# Patient Record
Sex: FEMALE | Race: WHITE | NOT HISPANIC OR LATINO
[De-identification: names, ages, dates, MRNs, and addresses within clinical notes are randomized per-mention and may not be internally consistent; named-entity substitution may affect disease eponyms.]

---

## 2018-01-29 ENCOUNTER — APPOINTMENT (OUTPATIENT)
Dept: OBGYN | Facility: CLINIC | Age: 63
End: 2018-01-29
Payer: COMMERCIAL

## 2018-01-29 VITALS
HEART RATE: 74 BPM | WEIGHT: 139 LBS | SYSTOLIC BLOOD PRESSURE: 148 MMHG | BODY MASS INDEX: 23.73 KG/M2 | HEIGHT: 64 IN | DIASTOLIC BLOOD PRESSURE: 91 MMHG

## 2018-01-29 PROCEDURE — 99213 OFFICE O/P EST LOW 20 MIN: CPT | Mod: 25

## 2018-01-29 PROCEDURE — 99396 PREV VISIT EST AGE 40-64: CPT

## 2018-02-13 LAB
CYTOLOGY CVX/VAG DOC THIN PREP: NORMAL
HPV HIGH+LOW RISK DNA PNL CVX: DETECTED

## 2019-02-06 ENCOUNTER — APPOINTMENT (OUTPATIENT)
Dept: OBGYN | Facility: CLINIC | Age: 64
End: 2019-02-06
Payer: COMMERCIAL

## 2019-02-06 VITALS
BODY MASS INDEX: 24.59 KG/M2 | WEIGHT: 144.06 LBS | SYSTOLIC BLOOD PRESSURE: 133 MMHG | HEIGHT: 64 IN | DIASTOLIC BLOOD PRESSURE: 76 MMHG | HEART RATE: 90 BPM

## 2019-02-06 PROCEDURE — 57456 ENDOCERV CURETTAGE W/SCOPE: CPT

## 2019-02-06 NOTE — PROCEDURE
[Colposcopy] : colposcopy [HPV high risk] : PCR positive for high risk HPV [Patient] : patient [Risks] : risks [Benefits] : benefits [Alternatives] : alternatives [Infection] : infection [Bleeding] : bleeding [Allergic Reaction] : allergic reaction [Consent Obtained] : written consent was obtained prior to the procedure [Biopsies Taken: # ___] : no biopsies were taken of the cervix [ECC Done] : Endocervical curettage was performed.  [Tolerated Well] : the patient tolerated the procedure well [No Complications] : there were no complications

## 2019-02-15 LAB — CORE LAB BIOPSY: NORMAL

## 2019-10-04 ENCOUNTER — APPOINTMENT (OUTPATIENT)
Dept: MAMMOGRAPHY | Facility: CLINIC | Age: 64
End: 2019-10-04
Payer: COMMERCIAL

## 2019-10-04 PROCEDURE — 77063 BREAST TOMOSYNTHESIS BI: CPT

## 2019-10-04 PROCEDURE — 77067 SCR MAMMO BI INCL CAD: CPT

## 2020-08-31 ENCOUNTER — APPOINTMENT (OUTPATIENT)
Dept: ORTHOPEDIC SURGERY | Facility: CLINIC | Age: 65
End: 2020-08-31
Payer: COMMERCIAL

## 2020-08-31 VITALS
TEMPERATURE: 97.8 F | BODY MASS INDEX: 24.59 KG/M2 | WEIGHT: 144 LBS | HEART RATE: 88 BPM | HEIGHT: 64 IN | DIASTOLIC BLOOD PRESSURE: 95 MMHG | SYSTOLIC BLOOD PRESSURE: 145 MMHG

## 2020-08-31 PROCEDURE — 99203 OFFICE O/P NEW LOW 30 MIN: CPT | Mod: 25

## 2020-08-31 PROCEDURE — 20550 NJX 1 TENDON SHEATH/LIGAMENT: CPT | Mod: F5

## 2020-08-31 NOTE — DISCUSSION/SUMMARY
[FreeTextEntry1] : She has findings consistent with a right trigger thumb.\par \par I had a discussion regarding today's visit, the diagnosis, and treatment recommendations / options. At this time, she agreed to proceed with a cortisone injection. \par \par The patient has agreed to this plan of management and has expressed full understanding.  All questions were fully answered to the patient's satisfaction.\par \par Over 50% of the time spent with the patient was on counseling the patient on the above diagnosis, treatment plan and prognosis.\par \par

## 2020-08-31 NOTE — HISTORY OF PRESENT ILLNESS
[Right] : right hand dominant [FreeTextEntry1] : She comes in today for an evaluation of a right thumb pain after gardening in the spring 4-5 weeks ago. She has complaints of locking, clicking, and a decreased range of motion.  She notes she experiences most of her pain particularly in the morning. She states she was diagnosed with cellulitis 3 weeks ago. She states she has been taking Doxy in which she takes her last pill today. She also notes she has been taking Prednisone as well. \par \par She works as a .

## 2020-08-31 NOTE — PROCEDURE
[FreeTextEntry1] : -  After a discussion of risks and benefits, the patient agreed to proceed with a cortisone injection.  \par -  Side: Right \par -  Finger: thumb\par -  Medications: 0.5 cc of 1% Lidocaine and 1 cc of Betamethasone, 6mg/cc, using sterile technique.\par -  Patient tolerated the procedure well, without complications.\par -  Patient was told that the symptoms may worsen for a day or two, and should then begin to improve. \par -  Instructions: Patient was instructed on activity modification for the next several days.\par -  Follow-up: Within 4 weeks to assess response to the injection.\par \par

## 2020-08-31 NOTE — END OF VISIT
[FreeTextEntry3] : All medical record entries made by the Scribe were at my, Dr. Casanova, direction and personally dictated by me on 08/31/2020. I have reviewed the chart and agree that the record accurately reflects my personal performances of the history, physical exam, assessment, and plan. I have also personally directed, reviewed, and agreed with the chart.\par \par

## 2020-08-31 NOTE — ADDENDUM
[FreeTextEntry1] : I, Karla Gurrola, acted solely as a scribe for Dr. Casanova on this date 08/31/2020.\par \par

## 2020-08-31 NOTE — PHYSICAL EXAM
[de-identified] : - Constitutional: This is a female in no obvious distress.  \par - Psych: Patient is alert and oriented to person, place and time.  Patient has a normal mood and affect.\par - Cardiovascular: Normal pulses throughout the upper extremities.  No significant varicosities are noted in the upper extremities. \par - Neuro: Strength and sensation are intact throughout the upper extremities.  Patient has normal coordination.\par - Respiratory:  Patient exhibits no evidence of shortness of breath or difficulty breathing.\par - Skin: No rashes, lesions, or other abnormalities are noted in the upper extremities.\par ---\par \par -  Side: Right Thumb\par -  There is swelling and tenderness along the A-1 pulley of the thumb.  \par -  There is evidence of active triggering with flexion and extension of the thumb.\par -  There is no evidence of an IP joint flexion contracture.  \par -  There is no tenderness along the MCP or CMC joints of the thumb.  \par -  There is no tenderness along the first dorsal compartment.  \par -  There is no evidence of triggering of the adjacent digits.\par -  There is full range-of-motion of the other digits into the palm.\par -  Provocative signs for carpal tunnel syndrome are negative.\par -  There is normal strength and sensation distally along the radial, ulnar and median nerve distributions.

## 2020-10-08 ENCOUNTER — APPOINTMENT (OUTPATIENT)
Dept: ORTHOPEDIC SURGERY | Facility: CLINIC | Age: 65
End: 2020-10-08
Payer: COMMERCIAL

## 2020-10-08 VITALS — WEIGHT: 144 LBS | HEIGHT: 64 IN | TEMPERATURE: 97.8 F | BODY MASS INDEX: 24.59 KG/M2

## 2020-10-08 PROCEDURE — 99213 OFFICE O/P EST LOW 20 MIN: CPT

## 2020-10-08 NOTE — END OF VISIT
[FreeTextEntry3] : All medical record entries made by the Scribe were at my, Dr. Casanova, direction and personally dictated by me on 10/08/2020. I have reviewed the chart and agree that the record accurately reflects my personal performances of the history, physical exam, assessment, and plan. I have also personally directed, reviewed, and agreed with the chart.\par \par

## 2020-10-08 NOTE — DISCUSSION/SUMMARY
[FreeTextEntry1] : I had a discussion regarding today's visit, the diagnosis and treatment recommendations / options.  At this time, as her symptoms have essentially resolved, I have recommended observation. If her symptoms reoccur, she will return to the office.\par \par The patient has agreed to the above plan of management and has expressed full understanding.  All questions were fully answered to the patient's satisfaction.\par \par I spent at least 25 minutes of face-to-face time with the patient.  Over 50% of this time was spent on counseling the patient on the above diagnosis, treatment plan and prognosis.

## 2020-10-08 NOTE — PHYSICAL EXAM
[de-identified] : - Constitutional: This is a female in no obvious distress.  \par - Psych: Patient is alert and oriented to person, place and time.  Patient has a normal mood and affect.\par - Cardiovascular: Normal pulses throughout the upper extremities.  No significant varicosities are noted in the upper extremities. \par - Neuro: Strength and sensation are intact throughout the upper extremities.  Patient has normal coordination.\par - Respiratory:  Patient exhibits no evidence of shortness of breath or difficulty breathing.\par - Skin: No rashes, lesions, or other abnormalities are noted in the upper extremities.\par ---\par \par Examination of her right thumb demonstrates no further swelling or tenderness along the A1 pulley.  There is no triggering.  She has full flexion and extension.  She is neurovascularly intact distally.

## 2020-10-08 NOTE — ADDENDUM
[FreeTextEntry1] : I, Karla Gurrola, acted solely as a scribe for Dr. Casanova on this date 10/08/2020.\par \par

## 2020-10-08 NOTE — HISTORY OF PRESENT ILLNESS
[FreeTextEntry1] : 38 days status post right trigger thumb cortisone injection #1.\par \par She is doing well with regards to her locking and clicking, but she has residual pain with activity, particularly when working with tools. She rates her pain a 0 out of 10. \par \par She works as a .

## 2020-11-16 ENCOUNTER — APPOINTMENT (OUTPATIENT)
Dept: RADIOLOGY | Facility: CLINIC | Age: 65
End: 2020-11-16
Payer: COMMERCIAL

## 2020-11-16 ENCOUNTER — APPOINTMENT (OUTPATIENT)
Dept: MAMMOGRAPHY | Facility: CLINIC | Age: 65
End: 2020-11-16

## 2020-11-16 PROCEDURE — 77080 DXA BONE DENSITY AXIAL: CPT

## 2020-11-16 PROCEDURE — 77063 BREAST TOMOSYNTHESIS BI: CPT

## 2020-11-16 PROCEDURE — 77067 SCR MAMMO BI INCL CAD: CPT

## 2021-10-13 ENCOUNTER — TRANSCRIPTION ENCOUNTER (OUTPATIENT)
Age: 66
End: 2021-10-13

## 2021-12-13 ENCOUNTER — APPOINTMENT (OUTPATIENT)
Dept: MAMMOGRAPHY | Facility: CLINIC | Age: 66
End: 2021-12-13
Payer: COMMERCIAL

## 2021-12-13 PROCEDURE — 77063 BREAST TOMOSYNTHESIS BI: CPT

## 2021-12-13 PROCEDURE — 77067 SCR MAMMO BI INCL CAD: CPT

## 2022-01-03 ENCOUNTER — NON-APPOINTMENT (OUTPATIENT)
Age: 67
End: 2022-01-03

## 2022-01-10 ENCOUNTER — APPOINTMENT (OUTPATIENT)
Dept: OBGYN | Facility: CLINIC | Age: 67
End: 2022-01-10
Payer: COMMERCIAL

## 2022-01-10 VITALS
DIASTOLIC BLOOD PRESSURE: 82 MMHG | BODY MASS INDEX: 24.24 KG/M2 | SYSTOLIC BLOOD PRESSURE: 140 MMHG | HEIGHT: 64 IN | TEMPERATURE: 97.6 F | HEART RATE: 78 BPM | WEIGHT: 142 LBS

## 2022-01-10 PROCEDURE — 99397 PER PM REEVAL EST PAT 65+ YR: CPT

## 2022-01-20 ENCOUNTER — NON-APPOINTMENT (OUTPATIENT)
Age: 67
End: 2022-01-20

## 2022-01-20 LAB
CYTOLOGY CVX/VAG DOC THIN PREP: ABNORMAL
HPV HIGH+LOW RISK DNA PNL CVX: DETECTED

## 2022-01-24 ENCOUNTER — TRANSCRIPTION ENCOUNTER (OUTPATIENT)
Age: 67
End: 2022-01-24

## 2022-02-07 ENCOUNTER — APPOINTMENT (OUTPATIENT)
Dept: OBGYN | Facility: CLINIC | Age: 67
End: 2022-02-07
Payer: COMMERCIAL

## 2022-02-07 VITALS
WEIGHT: 142 LBS | HEART RATE: 94 BPM | HEIGHT: 64 IN | SYSTOLIC BLOOD PRESSURE: 161 MMHG | DIASTOLIC BLOOD PRESSURE: 81 MMHG | BODY MASS INDEX: 24.24 KG/M2

## 2022-02-07 PROCEDURE — 57454 BX/CURETT OF CERVIX W/SCOPE: CPT

## 2022-02-15 ENCOUNTER — NON-APPOINTMENT (OUTPATIENT)
Age: 67
End: 2022-02-15

## 2022-02-15 LAB — CORE LAB BIOPSY: NORMAL

## 2022-11-06 ENCOUNTER — NON-APPOINTMENT (OUTPATIENT)
Age: 67
End: 2022-11-06

## 2022-12-23 ENCOUNTER — APPOINTMENT (OUTPATIENT)
Dept: MAMMOGRAPHY | Facility: CLINIC | Age: 67
End: 2022-12-23

## 2022-12-23 ENCOUNTER — APPOINTMENT (OUTPATIENT)
Dept: RADIOLOGY | Facility: CLINIC | Age: 67
End: 2022-12-23

## 2022-12-23 PROCEDURE — 77080 DXA BONE DENSITY AXIAL: CPT

## 2022-12-23 PROCEDURE — 77067 SCR MAMMO BI INCL CAD: CPT

## 2022-12-23 PROCEDURE — 77063 BREAST TOMOSYNTHESIS BI: CPT

## 2023-01-23 ENCOUNTER — APPOINTMENT (OUTPATIENT)
Dept: OBGYN | Facility: CLINIC | Age: 68
End: 2023-01-23
Payer: COMMERCIAL

## 2023-01-23 ENCOUNTER — LABORATORY RESULT (OUTPATIENT)
Age: 68
End: 2023-01-23

## 2023-01-23 VITALS
HEART RATE: 89 BPM | SYSTOLIC BLOOD PRESSURE: 142 MMHG | WEIGHT: 144.2 LBS | DIASTOLIC BLOOD PRESSURE: 89 MMHG | BODY MASS INDEX: 24.62 KG/M2 | HEIGHT: 64 IN

## 2023-01-23 PROCEDURE — 99397 PER PM REEVAL EST PAT 65+ YR: CPT

## 2023-01-30 LAB
CYTOLOGY CVX/VAG DOC THIN PREP: ABNORMAL
HPV HIGH+LOW RISK DNA PNL CVX: DETECTED

## 2023-02-04 ENCOUNTER — NON-APPOINTMENT (OUTPATIENT)
Age: 68
End: 2023-02-04

## 2023-02-06 ENCOUNTER — APPOINTMENT (OUTPATIENT)
Dept: OBGYN | Facility: CLINIC | Age: 68
End: 2023-02-06
Payer: COMMERCIAL

## 2023-02-06 VITALS
DIASTOLIC BLOOD PRESSURE: 91 MMHG | HEIGHT: 64 IN | BODY MASS INDEX: 24.92 KG/M2 | SYSTOLIC BLOOD PRESSURE: 134 MMHG | HEART RATE: 99 BPM | WEIGHT: 146 LBS

## 2023-02-06 PROCEDURE — 57454 BX/CURETT OF CERVIX W/SCOPE: CPT

## 2023-02-10 ENCOUNTER — APPOINTMENT (OUTPATIENT)
Dept: CARDIOLOGY | Facility: CLINIC | Age: 68
End: 2023-02-10
Payer: COMMERCIAL

## 2023-02-10 ENCOUNTER — NON-APPOINTMENT (OUTPATIENT)
Age: 68
End: 2023-02-10

## 2023-02-10 VITALS
TEMPERATURE: 97.3 F | DIASTOLIC BLOOD PRESSURE: 72 MMHG | OXYGEN SATURATION: 98 % | BODY MASS INDEX: 24.59 KG/M2 | WEIGHT: 144 LBS | HEART RATE: 96 BPM | HEIGHT: 64 IN | SYSTOLIC BLOOD PRESSURE: 134 MMHG | RESPIRATION RATE: 14 BRPM

## 2023-02-10 VITALS — SYSTOLIC BLOOD PRESSURE: 130 MMHG | DIASTOLIC BLOOD PRESSURE: 72 MMHG

## 2023-02-10 DIAGNOSIS — M65.311 TRIGGER THUMB, RIGHT THUMB: ICD-10-CM

## 2023-02-10 DIAGNOSIS — Z82.49 FAMILY HISTORY OF ISCHEMIC HEART DISEASE AND OTHER DISEASES OF THE CIRCULATORY SYSTEM: ICD-10-CM

## 2023-02-10 DIAGNOSIS — Z86.19 PERSONAL HISTORY OF OTHER INFECTIOUS AND PARASITIC DISEASES: ICD-10-CM

## 2023-02-10 DIAGNOSIS — N95.2 POSTMENOPAUSAL ATROPHIC VAGINITIS: ICD-10-CM

## 2023-02-10 PROCEDURE — 93000 ELECTROCARDIOGRAM COMPLETE: CPT

## 2023-02-10 PROCEDURE — 99205 OFFICE O/P NEW HI 60 MIN: CPT | Mod: 25

## 2023-02-10 RX ORDER — VALACYCLOVIR HYDROCHLORIDE 500 MG/1
500 TABLET, FILM COATED ORAL DAILY
Refills: 0 | Status: ACTIVE | COMMUNITY

## 2023-02-10 NOTE — HISTORY OF PRESENT ILLNESS
[FreeTextEntry1] : 67-year-old  female patient came for evaluation of only 1 episode of dizziness.  Apparently she jumped on new bed  she brought, she had vertigo, eyes rolled side-to-side that she became normal.  She had no other episodes since then.\par \par Short of breath while there is a location but denies any chest pain, PND, orthopnea, diaphoresis, dizziness, palpitations, pedal edema.\par \par No prior CHF, MI, syncope

## 2023-02-10 NOTE — ASSESSMENT
[FreeTextEntry1] : Shortness of breath on moderate exertion -recommend echocardiogram for LV size, lateral motion, LVEF; also recommended nuclear stress test to see any inducible ischemia.  Reassurance, no new medication at this point\par \par Episode of vertigo -no recurrent, I have recommended Zio patch for 7 days to see any arrhythmia.\par \par Risk factor modification has been discussed with her at great length.  She will be reevaluated after cardiac testing.

## 2023-02-17 LAB — CORE LAB BIOPSY: NORMAL

## 2023-03-13 ENCOUNTER — APPOINTMENT (OUTPATIENT)
Dept: CARDIOLOGY | Facility: CLINIC | Age: 68
End: 2023-03-13
Payer: COMMERCIAL

## 2023-03-13 PROCEDURE — 93015 CV STRESS TEST SUPVJ I&R: CPT

## 2023-03-13 PROCEDURE — 78452 HT MUSCLE IMAGE SPECT MULT: CPT

## 2023-03-13 PROCEDURE — 93306 TTE W/DOPPLER COMPLETE: CPT

## 2023-03-13 PROCEDURE — A9502: CPT

## 2023-03-20 ENCOUNTER — APPOINTMENT (OUTPATIENT)
Dept: CARDIOLOGY | Facility: CLINIC | Age: 68
End: 2023-03-20
Payer: COMMERCIAL

## 2023-03-20 VITALS
HEART RATE: 95 BPM | DIASTOLIC BLOOD PRESSURE: 80 MMHG | OXYGEN SATURATION: 96 % | TEMPERATURE: 97.8 F | SYSTOLIC BLOOD PRESSURE: 120 MMHG | BODY MASS INDEX: 25.4 KG/M2 | WEIGHT: 148 LBS

## 2023-03-20 PROCEDURE — 99214 OFFICE O/P EST MOD 30 MIN: CPT

## 2023-03-20 RX ORDER — MULTIVITAMIN
TABLET ORAL
Refills: 0 | Status: ACTIVE | COMMUNITY

## 2023-04-28 ENCOUNTER — NON-APPOINTMENT (OUTPATIENT)
Age: 68
End: 2023-04-28

## 2023-05-01 ENCOUNTER — APPOINTMENT (OUTPATIENT)
Dept: CARDIOLOGY | Facility: CLINIC | Age: 68
End: 2023-05-01
Payer: COMMERCIAL

## 2023-05-01 VITALS
BODY MASS INDEX: 24.37 KG/M2 | OXYGEN SATURATION: 96 % | DIASTOLIC BLOOD PRESSURE: 60 MMHG | WEIGHT: 142 LBS | HEART RATE: 78 BPM | SYSTOLIC BLOOD PRESSURE: 100 MMHG

## 2023-05-01 PROCEDURE — 99214 OFFICE O/P EST MOD 30 MIN: CPT

## 2023-06-09 ENCOUNTER — LABORATORY RESULT (OUTPATIENT)
Age: 68
End: 2023-06-09

## 2023-11-06 ENCOUNTER — APPOINTMENT (OUTPATIENT)
Dept: CARDIOLOGY | Facility: CLINIC | Age: 68
End: 2023-11-06
Payer: COMMERCIAL

## 2023-11-06 VITALS
DIASTOLIC BLOOD PRESSURE: 84 MMHG | OXYGEN SATURATION: 95 % | HEART RATE: 98 BPM | SYSTOLIC BLOOD PRESSURE: 128 MMHG | BODY MASS INDEX: 24.72 KG/M2 | WEIGHT: 144 LBS

## 2023-11-06 PROCEDURE — 99214 OFFICE O/P EST MOD 30 MIN: CPT

## 2023-11-06 RX ORDER — ATORVASTATIN CALCIUM 40 MG/1
40 TABLET, FILM COATED ORAL
Qty: 90 | Refills: 3 | Status: ACTIVE | COMMUNITY
Start: 2023-05-01 | End: 1900-01-01

## 2023-12-04 RX ORDER — METOPROLOL TARTRATE 50 MG/1
50 TABLET, FILM COATED ORAL DAILY
Qty: 90 | Refills: 2 | Status: ACTIVE | COMMUNITY
Start: 2023-03-20 | End: 1900-01-01

## 2023-12-11 ENCOUNTER — NON-APPOINTMENT (OUTPATIENT)
Age: 68
End: 2023-12-11

## 2024-01-19 ENCOUNTER — APPOINTMENT (OUTPATIENT)
Dept: MAMMOGRAPHY | Facility: CLINIC | Age: 69
End: 2024-01-19
Payer: COMMERCIAL

## 2024-01-19 PROCEDURE — 77067 SCR MAMMO BI INCL CAD: CPT

## 2024-01-19 PROCEDURE — 77063 BREAST TOMOSYNTHESIS BI: CPT

## 2024-01-29 ENCOUNTER — APPOINTMENT (OUTPATIENT)
Dept: OBGYN | Facility: CLINIC | Age: 69
End: 2024-01-29
Payer: COMMERCIAL

## 2024-01-29 ENCOUNTER — LABORATORY RESULT (OUTPATIENT)
Age: 69
End: 2024-01-29

## 2024-01-29 VITALS
BODY MASS INDEX: 25.1 KG/M2 | WEIGHT: 147 LBS | HEART RATE: 70 BPM | SYSTOLIC BLOOD PRESSURE: 133 MMHG | DIASTOLIC BLOOD PRESSURE: 84 MMHG | HEIGHT: 64 IN

## 2024-01-29 PROCEDURE — 99397 PER PM REEVAL EST PAT 65+ YR: CPT

## 2024-01-29 NOTE — PHYSICAL EXAM
[Chaperone Present] : A chaperone was present in the examining room during all aspects of the physical examination [Appropriately responsive] : appropriately responsive [Alert] : alert [No Acute Distress] : no acute distress [Regular Rate Rhythm] : regular rate rhythm [Soft] : soft [Non-tender] : non-tender [Non-distended] : non-distended [No Lesions] : no lesions [No Mass] : no mass [Oriented x3] : oriented x3 [FreeTextEntry5] : Non labored respirations [Examination Of The Breasts] : a normal appearance [No Masses] : no breast masses were palpable [Labia Majora] : normal [Labia Minora] : normal [Normal] : normal [Uterine Adnexae] : normal

## 2024-01-29 NOTE — HISTORY OF PRESENT ILLNESS
[FreeTextEntry1] : 69 y/o postmenopausal presents for well woman visit.  Doing well, has no concerns. Denies vaginal bleeding, pelvic pain, bloating, changes in bowel or bladder habits. Went for stress test, has 30% blockage, started on a statin and metoprolol, otherwise feels well. No other changes in med/surg history since last visit. Works as a  for [employer]. [Patient reported mammogram was normal] : Patient reported mammogram was normal [Patient reported bone density results were normal] : Patient reported bone density results were normal [Patient reported colonoscopy was normal] : Patient reported colonoscopy was normal [Mammogramdate] : 2023 [PapSmeardate] : 2023 [TextBox_31] : HPV s/p colpo [BoneDensityDate] : 2023 [ColonoscopyDate] : 2022

## 2024-01-29 NOTE — PLAN
[FreeTextEntry1] : 69 y/o postmenopausal for well woman visit  Plan: - Pap test today - up to date with other screening - RTO in 1 year or PRN

## 2024-01-31 LAB — HPV HIGH+LOW RISK DNA PNL CVX: DETECTED

## 2024-02-01 LAB — CYTOLOGY CVX/VAG DOC THIN PREP: ABNORMAL

## 2024-02-05 ENCOUNTER — APPOINTMENT (OUTPATIENT)
Dept: OBGYN | Facility: CLINIC | Age: 69
End: 2024-02-05
Payer: COMMERCIAL

## 2024-02-05 VITALS
HEART RATE: 80 BPM | HEIGHT: 64 IN | BODY MASS INDEX: 25.1 KG/M2 | SYSTOLIC BLOOD PRESSURE: 120 MMHG | DIASTOLIC BLOOD PRESSURE: 74 MMHG | WEIGHT: 147 LBS

## 2024-02-05 PROCEDURE — 57454 BX/CURETT OF CERVIX W/SCOPE: CPT

## 2024-02-05 NOTE — PROCEDURE
[Colposcopy] : Colposcopy  [Time out performed] : Pre-procedure time out performed.  Patient's name, date of birth and procedure confirmed. [Consent Obtained] : Consent obtained [Risks] : risks [Benefits] : benefits [Alternatives] : alternatives [Patient] : patient [Infection] : infection [Bleeding] : bleeding [Allergic Reaction] : allergic reaction [HPV High Risk] : HPV high risk [No Premedication] : no premedication [Colposcopy Adequate] : colposcopy adequate [ECC Performed] : ECC performed [No Abnormalities] : no abnormalities [Biopsy] : biopsy taken [Hemostasis Obtained] : Hemostasis obtained [Tolerated Well] : the patient tolerated the procedure well [Pap Performed] : pap not performed [SCI Fully Visualized] : SCI not fully visualized [de-identified] : recurrent HPV [de-identified] : No gross lesions/masses, acetowhite in 3 and 9 oclock positions [de-identified] : 3 [de-identified] : Cervical biopsy at 3 and 9 o'clock, ECC [de-identified] : Jong [de-identified] : HPV

## 2024-02-22 LAB — CORE LAB BIOPSY: NORMAL

## 2024-02-29 ENCOUNTER — NON-APPOINTMENT (OUTPATIENT)
Age: 69
End: 2024-02-29

## 2024-03-08 LAB — HBA1C MFR BLD HPLC: 5.7

## 2024-03-11 ENCOUNTER — APPOINTMENT (OUTPATIENT)
Dept: CARDIOLOGY | Facility: CLINIC | Age: 69
End: 2024-03-11
Payer: COMMERCIAL

## 2024-03-11 ENCOUNTER — NON-APPOINTMENT (OUTPATIENT)
Age: 69
End: 2024-03-11

## 2024-03-11 VITALS
DIASTOLIC BLOOD PRESSURE: 70 MMHG | HEART RATE: 87 BPM | WEIGHT: 143 LBS | SYSTOLIC BLOOD PRESSURE: 134 MMHG | OXYGEN SATURATION: 95 % | BODY MASS INDEX: 24.41 KG/M2 | HEIGHT: 64 IN

## 2024-03-11 DIAGNOSIS — E78.5 HYPERLIPIDEMIA, UNSPECIFIED: ICD-10-CM

## 2024-03-11 DIAGNOSIS — Z01.419 ENCOUNTER FOR GYNECOLOGICAL EXAMINATION (GENERAL) (ROUTINE) W/OUT ABNORMAL FINDINGS: ICD-10-CM

## 2024-03-11 DIAGNOSIS — B97.7 PAPILLOMAVIRUS AS THE CAUSE OF DISEASES CLASSIFIED ELSEWHERE: ICD-10-CM

## 2024-03-11 DIAGNOSIS — R06.02 SHORTNESS OF BREATH: ICD-10-CM

## 2024-03-11 DIAGNOSIS — R42 DIZZINESS AND GIDDINESS: ICD-10-CM

## 2024-03-11 DIAGNOSIS — Z87.898 PERSONAL HISTORY OF OTHER SPECIFIED CONDITIONS: ICD-10-CM

## 2024-03-11 DIAGNOSIS — M85.80 OTHER SPECIFIED DISORDERS OF BONE DENSITY AND STRUCTURE, UNSPECIFIED SITE: ICD-10-CM

## 2024-03-11 PROCEDURE — G2211 COMPLEX E/M VISIT ADD ON: CPT

## 2024-03-11 PROCEDURE — 99214 OFFICE O/P EST MOD 30 MIN: CPT

## 2024-03-11 PROCEDURE — 93000 ELECTROCARDIOGRAM COMPLETE: CPT

## 2024-03-11 NOTE — DISCUSSION/SUMMARY
[FreeTextEntry1] : Shortness of breath on moderate exertion -echo confirmed normal LV size, LV wall motion, LVEF; exercise nuclear stress test did show apical septal as well as apical anterior wall ischemia. Lopressor 50 mg to be taken at night. He has been. CT angio of the coronary did not confirm any significant blockages; calcium score 38 and she has stenosis less than 30%. She is asymptomatic. Now she is agreeable for Lipitor 40 mg daily, repeat panel in 6weeks.  Episode of vertigo -no recurrent, I have recommended Zio patch for 7 days to see any arrhythmia.  Preop  -clinical patient has no evidence of ACS or CHF.  She is in normal sinus rhythm.  Normal LVEF on echocardiogram.  Overall cardiac wise she is maximized for the planned procedure.  Proceed without delay.  Close monitoring of vitals perioperatively.  Please call me if I can assist you further.  Risk factor modification has been discussed with her at great length. She will be reevaluated in 6 months.  She will have lipid panel in 6 months.

## 2024-03-11 NOTE — HISTORY OF PRESENT ILLNESS
[FreeTextEntry1] : 68 -year-old  female patient with history of dyslipidemia, osteopenia, history of vertigo came for preop cardiac evaluation prior to her probably cone biopsy and surgery, she was found to have abnormal cells on colposcopy.  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  Short of breath usual exertion on more then usual exertion but denies any chest pain, PND, orthopnea, diaphoresis, dizziness, palpitations, pedal edema.  No new symptoms.  Lipid panel on December 11, 2023 confirm LDL 75, significant improvement on Crestor and she is tolerating very well    April 21, 2023    CT angio of coronaries confirmed calcium score 38, she does have plaquing less than 30%, no myocardial hypoperfusion. March 13, 2023    echocardiogram showed normal LV size, LV thickness, and wall motion, LVEF 65% without signal valvular abnormality.   March 13, 2023    a nuclear stress was done using Darien protocol; she exercised for 5 minutes with peak heart rate 164 bpm, peak blood pressure 1 7 2/100 mmHg; perfusion scan showed apical septal as well as apical anterior wall ischemia  No prior CHF, MI, syncope

## 2024-03-18 ENCOUNTER — OUTPATIENT (OUTPATIENT)
Dept: OUTPATIENT SERVICES | Facility: HOSPITAL | Age: 69
LOS: 1 days | End: 2024-03-18

## 2024-03-18 VITALS
TEMPERATURE: 98 F | DIASTOLIC BLOOD PRESSURE: 78 MMHG | OXYGEN SATURATION: 98 % | SYSTOLIC BLOOD PRESSURE: 117 MMHG | RESPIRATION RATE: 16 BRPM | WEIGHT: 143.96 LBS | HEIGHT: 62.5 IN | HEART RATE: 72 BPM

## 2024-03-18 DIAGNOSIS — R87.613 HIGH GRADE SQUAMOUS INTRAEPITHELIAL LESION ON CYTOLOGIC SMEAR OF CERVIX (HGSIL): ICD-10-CM

## 2024-03-18 DIAGNOSIS — N60.01 SOLITARY CYST OF RIGHT BREAST: Chronic | ICD-10-CM

## 2024-03-18 DIAGNOSIS — Z98.82 BREAST IMPLANT STATUS: Chronic | ICD-10-CM

## 2024-03-18 DIAGNOSIS — Z98.890 OTHER SPECIFIED POSTPROCEDURAL STATES: Chronic | ICD-10-CM

## 2024-03-18 DIAGNOSIS — R87.612 LOW GRADE SQUAMOUS INTRAEPITHELIAL LESION ON CYTOLOGIC SMEAR OF CERVIX (LGSIL): ICD-10-CM

## 2024-03-18 DIAGNOSIS — Z86.19 PERSONAL HISTORY OF OTHER INFECTIOUS AND PARASITIC DISEASES: ICD-10-CM

## 2024-03-18 DIAGNOSIS — I25.10 ATHEROSCLEROTIC HEART DISEASE OF NATIVE CORONARY ARTERY WITHOUT ANGINA PECTORIS: ICD-10-CM

## 2024-03-18 NOTE — H&P PST ADULT - NSANTHOSAYNRD_GEN_A_CORE
No. YONATHAN screening performed.  STOP BANG Legend: 0-2 = LOW Risk; 3-4 = INTERMEDIATE Risk; 5-8 = HIGH Risk

## 2024-03-18 NOTE — H&P PST ADULT - ASSESSMENT
Preop dx Low grade squamous intraepithelial lesion (LGSIL) at risk for high grade squamous intraepithelial lesion (HGSIL) on cytologic smear of cervix

## 2024-03-18 NOTE — H&P PST ADULT - PROBLEM SELECTOR PROBLEM 1
Low grade squamous intraepithelial lesion (LGSIL) at risk for high grade squamous intraepithelial lesion (HGSIL) on cytologic smear of cervix

## 2024-03-18 NOTE — H&P PST ADULT - NSICDXPASTMEDICALHX_GEN_ALL_CORE_FT
PAST MEDICAL HISTORY:  CAD (coronary artery disease)     H/O herpes simplex type 2 infection     HLD (hyperlipidemia)     Low grade squamous intraepithelial lesion (LGSIL) at risk for high grade squamous intraepithelial lesion (HGSIL) on cytologic smear of cervix

## 2024-03-18 NOTE — H&P PST ADULT - EKG AND INTERPRETATION
done with cardiologist 3/11/2024 JENNY (valdez)    Cardiologist note dated 3/11/2024 - April 21, 2023 CT angio of coronaries confirmed calcium score 38, she does have plaquing less than 30%, no myocardial hypoperfusion.  March 13, 2023 echocardiogram showed normal LV size, LV thickness, and wall motion, LVEF 65% without signal valvular abnormality.  March 13, 2023 a nuclear stress was done using Darien protocol; she exercised for 5 minutes with peak heart rate 164 bpm, peak blood pressure 1 7 2/100 mmHg; perfusion scan showed apical septal as well as apical anterior wall ischemia.

## 2024-03-18 NOTE — H&P PST ADULT - HISTORY OF PRESENT ILLNESS
69y/o female presents for preop eval for scheduled cold knife cone biopsy of cervix.  Pt states h/o abnormal pap smears for HPV.  H/o d&c and colposcopies.   Last  pap smear 1/2024.  Preop dx Low grade squamous intraepithelial lesion (LGSIL) at risk for high grade squamous intraepithelial lesion (HGSIL) on cytologic smear of cervix.

## 2024-03-18 NOTE — H&P PST ADULT - NSICDXPASTSURGICALHX_GEN_ALL_CORE_FT
PAST SURGICAL HISTORY:  Breast cyst, right     H/O breast implant     H/O myomectomy     History of D&C

## 2024-03-18 NOTE — H&P PST ADULT - NSICDXFAMILYHX_GEN_ALL_CORE_FT
FAMILY HISTORY:  Father  Still living? Unknown  Family history of leukemia, Age at diagnosis: Age Unknown    Mother  Still living? Unknown  FHx: heart disease, Age at diagnosis: Age Unknown    Aunt  Still living? Unknown  FHx: heart disease, Age at diagnosis: Age Unknown    Uncle  Still living? Unknown  FH: type 2 diabetes, Age at diagnosis: Age Unknown  FHx: heart disease, Age at diagnosis: Age Unknown

## 2024-03-18 NOTE — H&P PST ADULT - NEGATIVE FEMALE-SPECIFIC SYMPTOMS
herpes simplex II/no abnormal vaginal bleeding/no pelvic pain herpes simplex II, denies recent outbreak/no abnormal vaginal bleeding/no pelvic pain herpes simplex II, denies recent outbreak, refer to hpi/no abnormal vaginal bleeding/no pelvic pain

## 2024-03-18 NOTE — H&P PST ADULT - ATTENDING COMMENTS
69 y/o with persistent HPV, HGSIL on ECC, for cold knife cone biopsy of cervix.  R/b/a discussed with patient, patient voices understanding, consent signed and witnessed    Iam St MD

## 2024-03-18 NOTE — H&P PST ADULT - MAMMOGRAM, RESULTS OF LAST, PROFILE
issues: home exercises provided    Health Habits/Nutrition:  Health Habits/Nutrition  Do you exercise for at least 20 minutes 2-3 times per week?: Yes  Have you lost any weight without trying in the past 3 months?: No  Do you eat fewer than 2 meals per day?: No  Have you seen a dentist within the past year?: Yes  Body mass index is 32.05 kg/m². Health Habits/Nutrition Interventions:  · Inadequate physical activity:  patient agrees to exercise for at least 150 minutes/week    Personalized Preventive Plan   Current Health Maintenance Status  Immunization History   Administered Date(s) Administered    Influenza Vaccine, unspecified formulation 10/24/2011, 10/10/2012, 10/07/2013, 11/21/2014, 10/28/2015    Influenza, High Dose (Fluzone 65 yrs and older) 12/05/2016, 10/18/2017, 09/10/2018    Influenza, Triv, inactivated, subunit, adjuvanted, IM (Fluad 65 yrs and older) 10/16/2019    Pneumococcal Conjugate 13-valent ([id number]) 09/10/2018    Pneumococcal Polysaccharide ([id number]) 08/13/2011, 12/05/2016    Tdap (Boostrix, Adacel) 10/07/2013    Zoster Live (Zostavax) 08/13/2012        Health Maintenance   Topic Date Due    Diabetic retinal exam  01/06/1954    Shingles Vaccine (2 of 3) 10/08/2012    Low dose CT lung screening  05/15/2015    Annual Wellness Visit (AWV)  05/29/2019    Diabetic foot exam  10/12/2019    A1C test (Diabetic or Prediabetic)  12/30/2020    Lipid screen  12/30/2020    Potassium monitoring  02/01/2021    Creatinine monitoring  02/01/2021    DTaP/Tdap/Td vaccine (2 - Td) 10/07/2023    Colon cancer screen colonoscopy  11/30/2025    Flu vaccine  Completed    Pneumococcal 65+ years Vaccine  Completed    AAA screen  Completed    Hepatitis C screen  Completed    Hepatitis A vaccine  Aged Out    Hib vaccine  Aged Out    Meningococcal (ACWY) vaccine  Aged Out     Recommendations for Footbalistic Due: see orders and patient instructions/AVS.  .   Recommended screening schedule "normal

## 2024-03-18 NOTE — H&P PST ADULT - PROBLEM SELECTOR PLAN 1
Scheduled for cold knife cone biopsy of cervix  Written & verbal preop instructions, gi prophylaxis.   Pt verbalized good understanding.

## 2024-03-25 ENCOUNTER — TRANSCRIPTION ENCOUNTER (OUTPATIENT)
Age: 69
End: 2024-03-25

## 2024-03-25 NOTE — ASU PATIENT PROFILE, ADULT - TEACHING/LEARNING RELIGIOUS CONSIDERATIONS
----- Message from Clarence Ramsey MD sent at 2/23/2024  3:38 PM CST -----  Please inform the patient that his metabolic panel shows a slightly elevated blood sugar and he should try to stay as active as he can while trying to minimize his sugar and simple carbohydrates in it also shows slight dehydration so he should drink more non caffeinated nonalcoholic fluids.  Furthermore, his cholesterol panel is acceptable and he should follow the above suggestions as well as continuing to take his simvastatin 40 mg daily.  Finally, mostly because of his opiate medications his testosterone remains low- I would recommend that he continue utilizing 0.5 mL of the 200 milligram/mL solution but increase the frequency to every 14 instead of every 21days, and we will recheck this level again in 6 weeks.   none

## 2024-03-25 NOTE — ASU PATIENT PROFILE, ADULT - FALL HARM RISK - UNIVERSAL INTERVENTIONS
Bed in lowest position, wheels locked, appropriate side rails in place/Call bell, personal items and telephone in reach/Instruct patient to call for assistance before getting out of bed or chair/Non-slip footwear when patient is out of bed/[place] to call system/Physically safe environment - no spills, clutter or unnecessary equipment/Purposeful Proactive Rounding/Room/bathroom lighting operational, light cord in reach

## 2024-03-26 ENCOUNTER — TRANSCRIPTION ENCOUNTER (OUTPATIENT)
Age: 69
End: 2024-03-26

## 2024-03-26 ENCOUNTER — RESULT REVIEW (OUTPATIENT)
Age: 69
End: 2024-03-26

## 2024-03-26 ENCOUNTER — OUTPATIENT (OUTPATIENT)
Dept: OUTPATIENT SERVICES | Facility: HOSPITAL | Age: 69
LOS: 1 days | Discharge: HOME | End: 2024-03-26
Payer: COMMERCIAL

## 2024-03-26 ENCOUNTER — APPOINTMENT (OUTPATIENT)
Dept: OBGYN | Facility: HOSPITAL | Age: 69
End: 2024-03-26

## 2024-03-26 VITALS
DIASTOLIC BLOOD PRESSURE: 69 MMHG | SYSTOLIC BLOOD PRESSURE: 130 MMHG | RESPIRATION RATE: 18 BRPM | OXYGEN SATURATION: 100 % | HEART RATE: 81 BPM | TEMPERATURE: 98 F

## 2024-03-26 VITALS
OXYGEN SATURATION: 100 % | TEMPERATURE: 98 F | RESPIRATION RATE: 16 BRPM | SYSTOLIC BLOOD PRESSURE: 140 MMHG | DIASTOLIC BLOOD PRESSURE: 78 MMHG | HEART RATE: 65 BPM | WEIGHT: 143.96 LBS | HEIGHT: 62.5 IN

## 2024-03-26 DIAGNOSIS — N60.01 SOLITARY CYST OF RIGHT BREAST: Chronic | ICD-10-CM

## 2024-03-26 DIAGNOSIS — Z98.82 BREAST IMPLANT STATUS: Chronic | ICD-10-CM

## 2024-03-26 DIAGNOSIS — R87.613 HIGH GRADE SQUAMOUS INTRAEPITHELIAL LESION ON CYTOLOGIC SMEAR OF CERVIX (HGSIL): ICD-10-CM

## 2024-03-26 DIAGNOSIS — Z98.890 OTHER SPECIFIED POSTPROCEDURAL STATES: Chronic | ICD-10-CM

## 2024-03-26 PROCEDURE — 88342 IMHCHEM/IMCYTCHM 1ST ANTB: CPT | Mod: 26

## 2024-03-26 PROCEDURE — 88307 TISSUE EXAM BY PATHOLOGIST: CPT | Mod: 26

## 2024-03-26 PROCEDURE — 88305 TISSUE EXAM BY PATHOLOGIST: CPT | Mod: 26

## 2024-03-26 PROCEDURE — 57520 CONIZATION OF CERVIX: CPT | Mod: GC

## 2024-03-26 DEVICE — SURGICEL FIBRILLAR 2 X 4": Type: IMPLANTABLE DEVICE | Status: FUNCTIONAL

## 2024-03-26 RX ORDER — ONDANSETRON 8 MG/1
4 TABLET, FILM COATED ORAL ONCE
Refills: 0 | Status: DISCONTINUED | OUTPATIENT
Start: 2024-03-26 | End: 2024-04-09

## 2024-03-26 RX ORDER — SODIUM CHLORIDE 9 MG/ML
500 INJECTION, SOLUTION INTRAVENOUS ONCE
Refills: 0 | Status: COMPLETED | OUTPATIENT
Start: 2024-03-26 | End: 2024-03-26

## 2024-03-26 RX ORDER — FENTANYL CITRATE 50 UG/ML
25 INJECTION INTRAVENOUS
Refills: 0 | Status: DISCONTINUED | OUTPATIENT
Start: 2024-03-26 | End: 2024-03-26

## 2024-03-26 RX ORDER — VALACYCLOVIR 500 MG/1
1 TABLET, FILM COATED ORAL
Refills: 0 | DISCHARGE

## 2024-03-26 RX ORDER — SODIUM CHLORIDE 9 MG/ML
1000 INJECTION, SOLUTION INTRAVENOUS
Refills: 0 | Status: DISCONTINUED | OUTPATIENT
Start: 2024-03-26 | End: 2024-04-09

## 2024-03-26 RX ORDER — METOPROLOL TARTRATE 50 MG
1 TABLET ORAL
Refills: 0 | DISCHARGE

## 2024-03-26 RX ORDER — ATORVASTATIN CALCIUM 80 MG/1
1 TABLET, FILM COATED ORAL
Refills: 0 | DISCHARGE

## 2024-03-26 RX ADMIN — SODIUM CHLORIDE 1000 MILLILITER(S): 9 INJECTION, SOLUTION INTRAVENOUS at 16:55

## 2024-03-26 NOTE — ASU DISCHARGE PLAN (ADULT/PEDIATRIC) - NURSING INSTRUCTIONS
You received IV Tylenol for pain management at 4:15 PM. Please DO NOT take any Tylenol (Acetaminophen) containing products, such as Vicodin, Percocet, Excedrin, and cold medications for the next 6 hours (until 10:15 PM). DO NOT TAKE MORE THAN 3000 MG OF TYLENOL in a 24 hour period.  You received IV Toradol for pain management at 4:45 PM. Please DO NOT take Motrin/Ibuprofen/Advil/Aleve/NSAIDs (Non-Steroidal Anti-Inflammatory Drugs) for the next 6 hours (until 10:45 PM).

## 2024-03-26 NOTE — BRIEF OPERATIVE NOTE - OPERATION/FINDINGS
Grossly normal external genitalia  No cervical lesions or lesions seen in the vagina  No changes seen with application of Lugol's

## 2024-03-26 NOTE — ASU DISCHARGE PLAN (ADULT/PEDIATRIC) - NS MD DC FALL RISK RISK
For information on Fall & Injury Prevention, visit: [email or web address] OR  [email or web address] OR  https://www.cdc.gov/steadi/patient.html

## 2024-03-26 NOTE — ASU DISCHARGE PLAN (ADULT/PEDIATRIC) - ASU DC SPECIAL INSTRUCTIONSFT
You may notice brown/black discharge, this is normal. A solution was place during your surgery to help with bleeding

## 2024-03-26 NOTE — ASU DISCHARGE PLAN (ADULT/PEDIATRIC) - CARE PROVIDER_API CALL
Iam St  Obstetrics and Gynecology  [address], [address]  [place] [address]  Phone: [phone]  Fax: [phone]  Established Patient  Follow Up Time: 2 weeks

## 2024-03-26 NOTE — BRIEF OPERATIVE NOTE - NSICDXBRIEFPROCEDURE_GEN_ALL_CORE_FT
PROCEDURES:  Exam under anesthesia, pelvic 26-Mar-2024 16:58:48  Joe Vasquez  Biopsy, cold knife cone 26-Mar-2024 16:58:54  Joe Vasquez

## 2024-04-11 LAB — SURGICAL PATHOLOGY STUDY: SIGNIFICANT CHANGE UP

## 2024-04-22 ENCOUNTER — APPOINTMENT (OUTPATIENT)
Dept: OBGYN | Facility: CLINIC | Age: 69
End: 2024-04-22
Payer: COMMERCIAL

## 2024-04-22 VITALS
HEIGHT: 64 IN | DIASTOLIC BLOOD PRESSURE: 87 MMHG | WEIGHT: 144 LBS | HEART RATE: 79 BPM | BODY MASS INDEX: 24.59 KG/M2 | SYSTOLIC BLOOD PRESSURE: 146 MMHG

## 2024-04-22 DIAGNOSIS — R87.613 HIGH GRADE SQUAMOUS INTRAEPITHELIAL LESION ON CYTOLOGIC SMEAR OF CERVIX (HGSIL): ICD-10-CM

## 2024-04-22 PROBLEM — I25.10 ATHEROSCLEROTIC HEART DISEASE OF NATIVE CORONARY ARTERY WITHOUT ANGINA PECTORIS: Chronic | Status: ACTIVE | Noted: 2024-03-18

## 2024-04-22 PROBLEM — E78.5 HYPERLIPIDEMIA, UNSPECIFIED: Chronic | Status: ACTIVE | Noted: 2024-03-18

## 2024-04-22 PROBLEM — Z86.19 PERSONAL HISTORY OF OTHER INFECTIOUS AND PARASITIC DISEASES: Chronic | Status: ACTIVE | Noted: 2024-03-18

## 2024-04-22 PROBLEM — R87.612 LOW GRADE SQUAMOUS INTRAEPITHELIAL LESION ON CYTOLOGIC SMEAR OF CERVIX (LGSIL): Chronic | Status: ACTIVE | Noted: 2024-03-18

## 2024-04-22 PROCEDURE — 99024 POSTOP FOLLOW-UP VISIT: CPT

## 2024-04-22 NOTE — HISTORY OF PRESENT ILLNESS
[FreeTextEntry1] : 69 y/o s/p cold knife cone of cervix on 3/26/24 presents for follow up. Had a rough post op course--allergic reaction with welts, tachycardia. Was not really sure what started those symptoms but have since resolved. Reports discharge and light bleeding, less than a period. Pain is well controlled. Pathology reviewed with patient, chronic inflammation and reactive changes, multiple deeper level sections examined and p16 negative.

## 2024-04-22 NOTE — PHYSICAL EXAM
[Chaperone Present] : A chaperone was present in the examining room during all aspects of the physical examination [[id number]] : A chaperone was present during the pelvic exam. [FreeTextEntry2] : Sandra PENG [Appropriately responsive] : appropriately responsive [Alert] : alert [No Acute Distress] : no acute distress [Regular Rate Rhythm] : regular rate rhythm [Oriented x3] : oriented x3 [Labia Majora] : normal [Labia Minora] : normal [Normal] : normal [FreeTextEntry5] : s/p cold knife cone, healing well, no active bleeding

## 2024-04-22 NOTE — PLAN
[FreeTextEntry1] : 69 y/o s/p cold knife cone of cervix for post op visit, doing well  Plan: - Cervix examined, healing well, slight irritation but no active bleeding, encouraged bleeding precautions, avoid tubs, intercourse, anything per vagina - pathology reviewed; had reached out to pathologist regarding findings: up to 25% of cases, LEEP/cone is negative after diagnosis of HSIL. Reasons include: lesion was completely removed during biopsy, lesion is still in the patient, lesion is still in the block (cannot cut through the entire block-it would be hundreds of slides), or that the lesion was cleared by the patient's own immune system; the inflammation provoked during the biopsy/pap can add to that inflammation and help clear the lesion - recommend repeat pap in 6 months

## 2024-05-06 ENCOUNTER — APPOINTMENT (OUTPATIENT)
Dept: CARDIOLOGY | Facility: CLINIC | Age: 69
End: 2024-05-06

## 2024-07-22 ENCOUNTER — APPOINTMENT (OUTPATIENT)
Dept: OBGYN | Facility: CLINIC | Age: 69
End: 2024-07-22
Payer: COMMERCIAL

## 2024-07-22 VITALS
HEART RATE: 78 BPM | WEIGHT: 142 LBS | HEIGHT: 64 IN | DIASTOLIC BLOOD PRESSURE: 84 MMHG | BODY MASS INDEX: 24.24 KG/M2 | SYSTOLIC BLOOD PRESSURE: 124 MMHG

## 2024-07-22 DIAGNOSIS — N93.9 ABNORMAL UTERINE AND VAGINAL BLEEDING, UNSPECIFIED: ICD-10-CM

## 2024-07-22 PROCEDURE — 99213 OFFICE O/P EST LOW 20 MIN: CPT | Mod: 25

## 2024-07-22 PROCEDURE — 99459 PELVIC EXAMINATION: CPT

## 2024-07-22 PROCEDURE — 57100 BIOPSY VAGINAL MUCOSA SIMPLE: CPT

## 2024-07-22 NOTE — HISTORY OF PRESENT ILLNESS
[FreeTextEntry1] : 69 y/o presents with vaginal bleeding for the past 1-1.5 months. She had a [facility] biopsy of cervix end of March 2024, reports bleeding following the procedure which then stopped. A couple of weeks ago, patient looked with a mirror and saw a growth in her vagina. Reports bleeding is coming from the growth, because when she used a Q-tip it also bled. Otherwise is asymptomatic.

## 2024-07-22 NOTE — HISTORY OF PRESENT ILLNESS
[FreeTextEntry1] : 69 y/o presents with vaginal bleeding for the past 1-1.5 months. She had a [facility] biopsy of cervix end of March 2024, reports bleeding following the procedure which then stopped. A couple of weeks ago, patient looked with a mirror and saw a growth in her vagina. Reports bleeding is coming from the growth, because when she used a Q-tip it also bled. Otherwise is asymptomatic.

## 2024-07-22 NOTE — PLAN
[FreeTextEntry1] : 69 y/o with vaginal bleeding likely from growth on vaginal introitus  Plan: - Vaginal growth biopsied and sent to pathology - cervix overall normal appearing and healed well s/p CKC, however, bleeding on manipulation from speculum, likely from cervicitis or atrophy - recommend pelvic sono to assess endometrial lining as well - will f/u results

## 2024-07-22 NOTE — PHYSICAL EXAM
[Chaperone Present] : A chaperone was present in the examining room during all aspects of the physical examination [63687] : A chaperone was present during the pelvic exam. [FreeTextEntry2] : [place] PENG [Appropriately responsive] : appropriately responsive [Alert] : alert [No Acute Distress] : no acute distress [Regular Rate Rhythm] : regular rate rhythm [Oriented x3] : oriented x3 [Labia Majora] : normal [Labia Minora] : normal [Normal] : normal [FreeTextEntry4] : 2cm growth on left vaginal introitus, non tender, non ulcerated [FreeTextEntry5] : cervicitis/atrophy

## 2024-07-22 NOTE — PLAN
[FreeTextEntry1] : 67 y/o with vaginal bleeding likely from growth on vaginal introitus  Plan: - Vaginal growth biopsied and sent to pathology - cervix overall normal appearing and healed well s/p CKC, however, bleeding on manipulation from speculum, likely from cervicitis or atrophy - recommend pelvic sono to assess endometrial lining as well - will f/u results

## 2024-07-22 NOTE — PHYSICAL EXAM
[Chaperone Present] : A chaperone was present in the examining room during all aspects of the physical examination [[id number]] : A chaperone was present during the pelvic exam. [FreeTextEntry2] : [place] PENG [Appropriately responsive] : appropriately responsive [Alert] : alert [No Acute Distress] : no acute distress [Regular Rate Rhythm] : regular rate rhythm [Oriented x3] : oriented x3 [Labia Majora] : normal [Labia Minora] : normal [Normal] : normal [FreeTextEntry4] : 2cm growth on left vaginal introitus, non tender, non ulcerated [FreeTextEntry5] : cervicitis/atrophy

## 2024-07-26 ENCOUNTER — APPOINTMENT (OUTPATIENT)
Dept: ULTRASOUND IMAGING | Facility: CLINIC | Age: 69
End: 2024-07-26
Payer: COMMERCIAL

## 2024-07-26 PROCEDURE — 76830 TRANSVAGINAL US NON-OB: CPT

## 2024-07-28 ENCOUNTER — NON-APPOINTMENT (OUTPATIENT)
Age: 69
End: 2024-07-28

## 2024-07-29 LAB — CORE LAB BIOPSY: NORMAL

## 2024-08-05 ENCOUNTER — LABORATORY RESULT (OUTPATIENT)
Age: 69
End: 2024-08-05

## 2024-08-06 ENCOUNTER — NON-APPOINTMENT (OUTPATIENT)
Age: 69
End: 2024-08-06

## 2024-09-09 ENCOUNTER — APPOINTMENT (OUTPATIENT)
Dept: CARDIOLOGY | Facility: CLINIC | Age: 69
End: 2024-09-09
Payer: COMMERCIAL

## 2024-09-09 VITALS
HEIGHT: 64 IN | BODY MASS INDEX: 24.75 KG/M2 | WEIGHT: 145 LBS | OXYGEN SATURATION: 97 % | DIASTOLIC BLOOD PRESSURE: 80 MMHG | SYSTOLIC BLOOD PRESSURE: 130 MMHG | HEART RATE: 71 BPM

## 2024-09-09 DIAGNOSIS — M85.80 OTHER SPECIFIED DISORDERS OF BONE DENSITY AND STRUCTURE, UNSPECIFIED SITE: ICD-10-CM

## 2024-09-09 DIAGNOSIS — N93.9 ABNORMAL UTERINE AND VAGINAL BLEEDING, UNSPECIFIED: ICD-10-CM

## 2024-09-09 DIAGNOSIS — R42 DIZZINESS AND GIDDINESS: ICD-10-CM

## 2024-09-09 DIAGNOSIS — E78.5 HYPERLIPIDEMIA, UNSPECIFIED: ICD-10-CM

## 2024-09-09 DIAGNOSIS — R87.613 HIGH GRADE SQUAMOUS INTRAEPITHELIAL LESION ON CYTOLOGIC SMEAR OF CERVIX (HGSIL): ICD-10-CM

## 2024-09-09 DIAGNOSIS — R06.02 SHORTNESS OF BREATH: ICD-10-CM

## 2024-09-09 PROCEDURE — G2211 COMPLEX E/M VISIT ADD ON: CPT | Mod: NC

## 2024-09-09 PROCEDURE — 99214 OFFICE O/P EST MOD 30 MIN: CPT

## 2024-09-11 NOTE — DISCUSSION/SUMMARY
[FreeTextEntry1] : Shortness of breath on moderate exertion -echo confirmed normal LV size, LV wall motion, LVEF; exercise nuclear stress test did show apical septal as well as apical anterior wall ischemia. Lopressor 50 mg to be taken at night. He has been. CT angio of the coronary did not confirm any significant blockages; calcium score 38 and she has stenosis less than 30%. She is asymptomatic. Now she is agreeable for Lipitor 40 mg daily, repeat panel in 6weeks.  Episode of vertigo -no recurrent, I have recommended Zio patch for 7 days to see any arrhythmia.  Risk factor modification has been discussed with her at great length. She will be reevaluated in 6 months.  She will have lipid panel in 6 months.

## 2024-09-11 NOTE — HISTORY OF PRESENT ILLNESS
[FreeTextEntry1] : 69 -year-old  female patient with history of dyslipidemia, osteopenia, history of vertigo came for preop cardiac evaluation prior to her probably cone biopsy and surgery, she was found to have abnormal cells on colposcopy.  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  Short of breath usual exertion on more then usual exertion but denies any chest pain, PND, orthopnea, diaphoresis, dizziness, palpitations, pedal edema.  No new symptoms.  Lipid panel on December 11, 2023 confirm LDL 75, significant improvement on Crestor and she is tolerating very well    April 21, 2023    CT angio of coronaries confirmed calcium score 38, she does have plaquing less than 30%, no myocardial hypoperfusion. March 13, 2023    echocardiogram showed normal LV size, LV thickness, and wall motion, LVEF 65% without signal valvular abnormality.   March 13, 2023    a nuclear stress was done using [place] protocol; she exercised for 5 minutes with peak heart rate 164 bpm, peak blood pressure 1 7 2/100 mmHg; perfusion scan showed apical septal as well as apical anterior wall ischemia  No prior CHF, MI, syncope

## 2024-09-16 ENCOUNTER — LABORATORY RESULT (OUTPATIENT)
Age: 69
End: 2024-09-16

## 2024-09-16 ENCOUNTER — APPOINTMENT (OUTPATIENT)
Dept: OBGYN | Facility: CLINIC | Age: 69
End: 2024-09-16
Payer: COMMERCIAL

## 2024-09-16 VITALS
HEART RATE: 81 BPM | WEIGHT: 144 LBS | HEIGHT: 64 IN | SYSTOLIC BLOOD PRESSURE: 144 MMHG | BODY MASS INDEX: 24.59 KG/M2 | DIASTOLIC BLOOD PRESSURE: 88 MMHG

## 2024-09-16 DIAGNOSIS — Z01.419 ENCOUNTER FOR GYNECOLOGICAL EXAMINATION (GENERAL) (ROUTINE) W/OUT ABNORMAL FINDINGS: ICD-10-CM

## 2024-09-16 PROCEDURE — 99397 PER PM REEVAL EST PAT 65+ YR: CPT

## 2024-09-16 PROCEDURE — 99459 PELVIC EXAMINATION: CPT

## 2024-09-16 NOTE — PLAN
[FreeTextEntry1] : 70 y/o postmenopausal for well woman visit  Plan: - Pap test repeated today - mammogram, colonoscopy and DEXA up to date - RTO for annual or PRN

## 2024-09-16 NOTE — HISTORY OF PRESENT ILLNESS
[FreeTextEntry1] : 70 y/o postmenopausal presents for well woman visit. 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 Also here for 6 month pap follow up. Up to date with mammogram, colonoscopy and DEXA scan.

## 2024-09-16 NOTE — PHYSICAL EXAM
[Chaperone Present] : A chaperone was present in the examining room during all aspects of the physical examination [[id number]] : A chaperone was present during the pelvic exam. [FreeTextEntry2] : [place] PENG [Appropriately responsive] : appropriately responsive [Alert] : alert [No Acute Distress] : no acute distress [Regular Rate Rhythm] : regular rate rhythm [Soft] : soft [Non-tender] : non-tender [Non-distended] : non-distended [No Lesions] : no lesions [No Mass] : no mass [Oriented x3] : oriented x3 [FreeTextEntry5] : non labored breathing [Examination Of The Breasts] : a normal appearance [No Masses] : no breast masses were palpable [Labia Majora] : normal [Labia Minora] : normal [Normal] : normal [Uterine Adnexae] : normal

## 2024-09-19 LAB — HPV HIGH+LOW RISK DNA PNL CVX: DETECTED

## 2024-09-23 LAB — CYTOLOGY CVX/VAG DOC THIN PREP: NORMAL

## 2024-09-25 NOTE — H&P PST ADULT - NEGATIVE CARDIOVASCULAR SYMPTOMS
Reason for call:   [x] Refill   [] Prior Auth  [] Other:     Office:   [] PCP/Provider -   [x] Specialty/Provider - GASTRO [facility] TYSON Lee PA-C     Medication:  colestipol (COLESTID) 1 g tablet    Dose/Frequency: Take 2 tablets (2 g total) by mouth in the morning 2 hours separate from other meds     Quantity:  30 tablet     Pharmacy: [id number]  [id number] [facility] [phone]    Does the patient have enough for 3 days?   [] Yes   [x] No - Send as HP to POD    
no chest pain/no palpitations/no dyspnea on exertion/no orthopnea/no paroxysmal nocturnal dyspnea/no peripheral edema/no claudication

## 2024-10-01 ENCOUNTER — APPOINTMENT (OUTPATIENT)
Dept: CARDIOLOGY | Facility: CLINIC | Age: 69
End: 2024-10-01

## 2025-01-03 ENCOUNTER — APPOINTMENT (OUTPATIENT)
Dept: MAMMOGRAPHY | Facility: CLINIC | Age: 70
End: 2025-01-03
Payer: COMMERCIAL

## 2025-01-03 ENCOUNTER — APPOINTMENT (OUTPATIENT)
Dept: RADIOLOGY | Facility: CLINIC | Age: 70
End: 2025-01-03

## 2025-01-03 PROCEDURE — 77080 DXA BONE DENSITY AXIAL: CPT

## 2025-01-03 PROCEDURE — 77067 SCR MAMMO BI INCL CAD: CPT

## 2025-01-03 PROCEDURE — 77063 BREAST TOMOSYNTHESIS BI: CPT

## 2025-02-14 ENCOUNTER — LABORATORY RESULT (OUTPATIENT)
Age: 70
End: 2025-02-14

## 2025-03-03 ENCOUNTER — APPOINTMENT (OUTPATIENT)
Dept: CARDIOLOGY | Facility: CLINIC | Age: 70
End: 2025-03-03
Payer: COMMERCIAL

## 2025-03-03 VITALS
BODY MASS INDEX: 24.72 KG/M2 | HEART RATE: 95 BPM | SYSTOLIC BLOOD PRESSURE: 110 MMHG | WEIGHT: 144 LBS | DIASTOLIC BLOOD PRESSURE: 68 MMHG | OXYGEN SATURATION: 96 %

## 2025-03-03 DIAGNOSIS — E78.5 HYPERLIPIDEMIA, UNSPECIFIED: ICD-10-CM

## 2025-03-03 DIAGNOSIS — R06.02 SHORTNESS OF BREATH: ICD-10-CM

## 2025-03-03 PROCEDURE — 93000 ELECTROCARDIOGRAM COMPLETE: CPT

## 2025-03-03 PROCEDURE — 99214 OFFICE O/P EST MOD 30 MIN: CPT

## 2025-08-01 ENCOUNTER — LABORATORY RESULT (OUTPATIENT)
Age: 70
End: 2025-08-01

## (undated) DEVICE — SOL IRR POUR NS 0.9% 500ML

## (undated) DEVICE — APPLICATOR Q TIP 6" WOOD STEM

## (undated) DEVICE — PROTECTOR HEEL / ELBOW FLUFFY

## (undated) DEVICE — MARKING PEN W RULER

## (undated) DEVICE — DRSG PAD SANITARY OB

## (undated) DEVICE — BASIN SET SINGLE

## (undated) DEVICE — SUT SILK 2-0 30" SH

## (undated) DEVICE — POSITIONER STRAP ARMBOARD VELCRO TS-30

## (undated) DEVICE — SYR LUER LOK 10CC

## (undated) DEVICE — VISITEC 4X4

## (undated) DEVICE — WARMING BLANKET FULL ADULT

## (undated) DEVICE — DRAPE TOWEL BLUE 17" X 24"

## (undated) DEVICE — CONNECTOR 5 IN1 SUCTION TUBING

## (undated) DEVICE — NDL SPINAL 25G X 3.5" (BLUE)

## (undated) DEVICE — TUBING SUCTION NONCONDUCTIVE 6MM X 12FT

## (undated) DEVICE — ELCTR BALL LLETZ LG 5MM

## (undated) DEVICE — GOWN LG

## (undated) DEVICE — ELCTR BOVIE PENCIL BLADE 10FT

## (undated) DEVICE — VENODYNE/SCD SLEEVE CALF MEDIUM

## (undated) DEVICE — LABELS BLANK W PEN

## (undated) DEVICE — PACK D&C

## (undated) DEVICE — NDL SPINAL 22G X 3.5" (BLACK)